# Patient Record
Sex: FEMALE | Race: WHITE | URBAN - METROPOLITAN AREA
[De-identification: names, ages, dates, MRNs, and addresses within clinical notes are randomized per-mention and may not be internally consistent; named-entity substitution may affect disease eponyms.]

---

## 2019-07-14 ENCOUNTER — WALK IN (OUTPATIENT)
Dept: URGENT CARE | Age: 24
End: 2019-07-14

## 2019-07-14 VITALS
WEIGHT: 130.29 LBS | SYSTOLIC BLOOD PRESSURE: 100 MMHG | BODY MASS INDEX: 24.6 KG/M2 | DIASTOLIC BLOOD PRESSURE: 70 MMHG | OXYGEN SATURATION: 99 % | HEIGHT: 61 IN | RESPIRATION RATE: 14 BRPM | HEART RATE: 68 BPM | TEMPERATURE: 98.2 F

## 2019-07-14 DIAGNOSIS — J02.0 STREP PHARYNGITIS: Primary | ICD-10-CM

## 2019-07-14 DIAGNOSIS — Z20.818 STREP THROAT EXPOSURE: ICD-10-CM

## 2019-07-14 LAB
INTERNAL PROCEDURAL CONTROLS ACCEPTABLE: YES
S PYO AG THROAT QL IA.RAPID: POSITIVE

## 2019-07-14 PROCEDURE — 99204 OFFICE O/P NEW MOD 45 MIN: CPT | Performed by: NURSE PRACTITIONER

## 2019-07-14 PROCEDURE — 87880 STREP A ASSAY W/OPTIC: CPT | Performed by: NURSE PRACTITIONER

## 2019-07-14 RX ORDER — AMOXICILLIN 500 MG/1
500 TABLET, FILM COATED ORAL 2 TIMES DAILY
Qty: 20 TABLET | Refills: 0 | Status: SHIPPED | OUTPATIENT
Start: 2019-07-14 | End: 2019-07-24

## 2019-07-14 ASSESSMENT — ENCOUNTER SYMPTOMS
PSYCHIATRIC NEGATIVE: 1
GASTROINTESTINAL NEGATIVE: 1
RESPIRATORY NEGATIVE: 1
SORE THROAT: 1
NEUROLOGICAL NEGATIVE: 1
FEVER: 1

## 2019-07-15 ENCOUNTER — TELEPHONE (OUTPATIENT)
Dept: URGENT CARE | Age: 24
End: 2019-07-15

## 2019-10-10 ENCOUNTER — APPOINTMENT (OUTPATIENT)
Dept: OTOLARYNGOLOGY | Facility: CLINIC | Age: 24
End: 2019-10-10
Payer: COMMERCIAL

## 2019-10-10 VITALS
BODY MASS INDEX: 23.79 KG/M2 | DIASTOLIC BLOOD PRESSURE: 82 MMHG | WEIGHT: 126 LBS | SYSTOLIC BLOOD PRESSURE: 127 MMHG | HEIGHT: 61 IN | HEART RATE: 81 BPM

## 2019-10-10 DIAGNOSIS — J35.3 HYPERTROPHY OF TONSILS WITH HYPERTROPHY OF ADENOIDS: ICD-10-CM

## 2019-10-10 DIAGNOSIS — J34.89 OTHER SPECIFIED DISORDERS OF NOSE AND NASAL SINUSES: ICD-10-CM

## 2019-10-10 DIAGNOSIS — Z78.9 OTHER SPECIFIED HEALTH STATUS: ICD-10-CM

## 2019-10-10 DIAGNOSIS — J03.91 ACUTE RECURRENT TONSILLITIS, UNSPECIFIED: ICD-10-CM

## 2019-10-10 DIAGNOSIS — J34.2 DEVIATED NASAL SEPTUM: ICD-10-CM

## 2019-10-10 DIAGNOSIS — Z00.00 ENCOUNTER FOR GENERAL ADULT MEDICAL EXAMINATION W/OUT ABNORMAL FINDINGS: ICD-10-CM

## 2019-10-10 DIAGNOSIS — B27.90 INFECTIOUS MONONUCLEOSIS, UNSPECIFIED W/OUT COMPLICATION: ICD-10-CM

## 2019-10-10 PROCEDURE — 99204 OFFICE O/P NEW MOD 45 MIN: CPT | Mod: 25

## 2019-10-10 PROCEDURE — 31231 NASAL ENDOSCOPY DX: CPT

## 2019-10-10 RX ORDER — FLUTICASONE PROPIONATE 50 UG/1
50 SPRAY, METERED NASAL DAILY
Qty: 1 | Refills: 3 | Status: ACTIVE | COMMUNITY
Start: 2019-10-10 | End: 1900-01-01

## 2019-10-13 PROBLEM — Z00.00 ENCOUNTER FOR PREVENTIVE HEALTH EXAMINATION: Status: ACTIVE | Noted: 2019-10-10

## 2019-10-13 PROBLEM — J34.89 NASAL OBSTRUCTION: Status: ACTIVE | Noted: 2019-10-13

## 2019-10-13 RX ORDER — PREDNISONE 10 MG/1
10 TABLET ORAL
Refills: 0 | Status: ACTIVE | COMMUNITY

## 2019-10-13 RX ORDER — DROSPIRENONE/ETHINYL ESTRADIOL/LEVOMEFOLATE CALCIUM AND LEVOMEFOLATE CALCIUM 3-0.02(24)
3-0.02-0.451 KIT ORAL
Refills: 0 | Status: ACTIVE | COMMUNITY

## 2019-10-13 RX ORDER — CEFDINIR 300 MG/1
300 CAPSULE ORAL
Refills: 0 | Status: ACTIVE | COMMUNITY

## 2019-10-13 NOTE — PHYSICAL EXAM
[] : septum deviated to the left [Midline] : trachea located in midline position [Laryngoscopy Performed] : laryngoscopy was performed, see procedure section for findings [Normal] : no rashes [Nasal Endoscopy Performed] : nasal endoscopy was performed, see procedure section for findings [FreeTextEntry1] : Slight hyponasal voice. [de-identified] : Enlarged, NT level LNs bilateral. [de-identified] : 3+ bilateral, mild erythema. [de-identified] : Carotid pulses 2+ bilateral.

## 2019-10-13 NOTE — PROCEDURE
[FreeTextEntry6] : \par Indication:  nasal obstruction\par -Verbal consent was obtained from patient prior to exam. \par - Oskar-Synephrine  spray applied to nose bilaterally.\par Nasal endoscopy was performed with flexible scope.\par Findings: \par -- Inferior turbinates normal bilateral.  Inferior meatus clear bilateral.\par -- Septum was deviated to the left \par -- No polyps either side nose\par -- Mucus clear bilateral\par -- Middle and superior turbinates normal bilateral\par -- Middle meatus clear bilateral.  SER clear bilateral.\par -- Adenoids were 90% obstructive of posterior choanae; no exudate; mild redness\par -- Eustachian orifices were not visible \par \par The patient tolerated the procedure well.\par \par

## 2019-10-13 NOTE — REVIEW OF SYSTEMS
[As Noted in HPI] : as noted in HPI [Cough] : cough [Swollen Glands In The Neck] : swollen glands in the neck [Negative] : Endocrine [FreeTextEntry2] : daytime sleepiness, loss of appetite when sick [FreeTextEntry6] : chest congestion

## 2019-10-13 NOTE — HISTORY OF PRESENT ILLNESS
[de-identified] : I was pleased to evaluate Ms. Machado, a 24 year old woman who was referred by Dr. Inman for tonsil/adenoid surgery.  \par She was healthy until a series of events starting in July 2019:\par   - July 13:  Woke up with fever, severe throat pain, dysphagia, swollen neck nodes --> urgent care diagnosed strep tonsillitis (rapid strep faintly positive; monospot negative), treated with amox, finished 7/22 and felt fine.\par   - July 26: recurrent sx;  strep screeen and monospot negative. Tonsillitis treated with cefuroxime.\par   After finished cefuroxime, would get frequent nasal congestion and postnasal drip.  Went to ENT and was told had swollen tonsils and pus.  Put on prednisone and tested again for mono (EBV titers) --> negative.  Throat culture was positive for a non-strep bacteria.  Saw ID who said bacteria was not pathogenic but EBV titers showed recent infection and advised rest/fluids.\par   - Around end of August, woke up with jaw/ear pain and lot of green mucus from nose.  Called the ID doctor who gave cefuroxime x 10 days for sinus infection.  Felt fine but about 4 days after antibiotic finished, felt congested and achy again.\par   - Referred to Dr. Inman who diagnosed massive adenoids.  Treated with prednisone taper, ended 9/29/19.  T&A recommended.\par \par Felt good after prednisone each time but only for short time.  No prior tonsil or nasal breathing problems.\par Felt stuffy again over weekend and diagnosed with T&A infection.  Now on day 3 of cefdinir and prednisone.\par Currently has no fatigued or body aches. Nasal congestion constant and postnasal drip frequent.  Snoring regularly since July; hard to sleep due to obstruction.\par Works in [employer] and was traveling a lot before the onset of initial sx. \par Lives in [place]th floor walk[place].  Family lives in [place], and mother encouraging her to have surgery there.\par \par \par  LABS\par (8/10/2019) -- EBV titers negative\par (8/23/2019) -- EBV titers negative except for IGM, c/w suspected primary infection (early phase)\par

## 2019-10-13 NOTE — ASSESSMENT
[FreeTextEntry1] : Ms. Machado has recurrent/chronic tonsilloadenoiditis in background of acute mononucleosis.\par Her systemic sx from mono are mainly resolved.\par Nasal obstruction is due to adenoid hypertrophy.  She has a deviated nasal septum but states breathing was fine until July.\par The snoring and probable intermittent MAIK are associated with the tonsil and adenoid enlargement.\par \par I agree with Dr. Inman and recommend T&A.  We reviewed the procedure, risks, benefits and alternatives.\par I have explained the risks of tonsillectomy and adenoidectomy including but not limited to general anesthesia, bleeding, infection, injury to the teeth/lips/gums, tonsil and/or adenoid regrowth, persistence of symptoms, velopharyngeal insufficiency, nasopharyngeal stenosis, swallowing dysfunction, post-operative hemorrhage, voice changes, and possible need for further procedures.\par \par She should have the T&A in [place] where family lives since recovery will be easier.  Not one needs to walk up 5 flights of stairs after surgery, for example.\par In meantime, can use fluticasone nasal spray which may help control size of adenoids.\par Apply Afrin nasal spray prior to flight to [place] to prevent ear clogging.\par \par

## 2019-10-13 NOTE — CONSULT LETTER
[Dear  ___] : Dear  [unfilled], [Consult Letter:] : I had the pleasure of evaluating your patient, [unfilled]. [Consult Closing:] : Thank you very much for allowing me to participate in the care of this patient.  If you have any questions, please do not hesitate to contact me. [Sincerely,] : Sincerely, [DrYaakov  ___] : Dr. MANJARREZ [___] : [unfilled] [FreeTextEntry2] : Mali Inman MD\par [address]\[place] [address]\par  [FreeTextEntry1] : \par \par Enclosed please find my office notes for  October 10, 2019. \par \par  [FreeTextEntry3] : \par Bridget Juarez MD \par Otolaryngology, Head and Neck Surgery \par \par